# Patient Record
Sex: MALE | Race: OTHER | HISPANIC OR LATINO | URBAN - METROPOLITAN AREA
[De-identification: names, ages, dates, MRNs, and addresses within clinical notes are randomized per-mention and may not be internally consistent; named-entity substitution may affect disease eponyms.]

---

## 2023-03-09 ENCOUNTER — EMERGENCY (EMERGENCY)
Facility: HOSPITAL | Age: 43
LOS: 1 days | Discharge: HOME | End: 2023-03-09
Attending: EMERGENCY MEDICINE
Payer: COMMERCIAL

## 2023-03-09 VITALS
OXYGEN SATURATION: 96 % | TEMPERATURE: 98 F | SYSTOLIC BLOOD PRESSURE: 119 MMHG | RESPIRATION RATE: 18 BRPM | DIASTOLIC BLOOD PRESSURE: 81 MMHG | HEART RATE: 88 BPM | WEIGHT: 149.91 LBS | HEIGHT: 66 IN

## 2023-03-09 LAB
ALBUMIN SERPL ELPH-MCNC: 4.3 G/DL — SIGNIFICANT CHANGE UP (ref 3.3–5.2)
ALP SERPL-CCNC: 84 U/L — SIGNIFICANT CHANGE UP (ref 40–120)
ALT FLD-CCNC: 21 U/L — SIGNIFICANT CHANGE UP
ANION GAP SERPL CALC-SCNC: 12 MMOL/L — SIGNIFICANT CHANGE UP (ref 5–17)
APTT BLD: 31.2 SEC — SIGNIFICANT CHANGE UP (ref 27.5–35.5)
AST SERPL-CCNC: 31 U/L — SIGNIFICANT CHANGE UP
BASOPHILS # BLD AUTO: 0.02 K/UL — SIGNIFICANT CHANGE UP (ref 0–0.2)
BASOPHILS NFR BLD AUTO: 0.3 % — SIGNIFICANT CHANGE UP (ref 0–2)
BILIRUB SERPL-MCNC: 0.3 MG/DL — LOW (ref 0.4–2)
BLD GP AB SCN SERPL QL: SIGNIFICANT CHANGE UP
BUN SERPL-MCNC: 18.3 MG/DL — SIGNIFICANT CHANGE UP (ref 8–20)
CALCIUM SERPL-MCNC: 8.7 MG/DL — SIGNIFICANT CHANGE UP (ref 8.4–10.5)
CHLORIDE SERPL-SCNC: 104 MMOL/L — SIGNIFICANT CHANGE UP (ref 96–108)
CO2 SERPL-SCNC: 25 MMOL/L — SIGNIFICANT CHANGE UP (ref 22–29)
CREAT SERPL-MCNC: 0.85 MG/DL — SIGNIFICANT CHANGE UP (ref 0.5–1.3)
EGFR: 111 ML/MIN/1.73M2 — SIGNIFICANT CHANGE UP
EOSINOPHIL # BLD AUTO: 0.03 K/UL — SIGNIFICANT CHANGE UP (ref 0–0.5)
EOSINOPHIL NFR BLD AUTO: 0.4 % — SIGNIFICANT CHANGE UP (ref 0–6)
GLUCOSE SERPL-MCNC: 114 MG/DL — HIGH (ref 70–99)
HCT VFR BLD CALC: 45 % — SIGNIFICANT CHANGE UP (ref 39–50)
HGB BLD-MCNC: 15.1 G/DL — SIGNIFICANT CHANGE UP (ref 13–17)
IMM GRANULOCYTES NFR BLD AUTO: 0.3 % — SIGNIFICANT CHANGE UP (ref 0–0.9)
INR BLD: 1.08 RATIO — SIGNIFICANT CHANGE UP (ref 0.88–1.16)
LYMPHOCYTES # BLD AUTO: 0.94 K/UL — LOW (ref 1–3.3)
LYMPHOCYTES # BLD AUTO: 13.9 % — SIGNIFICANT CHANGE UP (ref 13–44)
MCHC RBC-ENTMCNC: 30 PG — SIGNIFICANT CHANGE UP (ref 27–34)
MCHC RBC-ENTMCNC: 33.6 GM/DL — SIGNIFICANT CHANGE UP (ref 32–36)
MCV RBC AUTO: 89.3 FL — SIGNIFICANT CHANGE UP (ref 80–100)
MONOCYTES # BLD AUTO: 0.3 K/UL — SIGNIFICANT CHANGE UP (ref 0–0.9)
MONOCYTES NFR BLD AUTO: 4.4 % — SIGNIFICANT CHANGE UP (ref 2–14)
NEUTROPHILS # BLD AUTO: 5.46 K/UL — SIGNIFICANT CHANGE UP (ref 1.8–7.4)
NEUTROPHILS NFR BLD AUTO: 80.7 % — HIGH (ref 43–77)
PLATELET # BLD AUTO: 293 K/UL — SIGNIFICANT CHANGE UP (ref 150–400)
POTASSIUM SERPL-MCNC: 4.3 MMOL/L — SIGNIFICANT CHANGE UP (ref 3.5–5.3)
POTASSIUM SERPL-SCNC: 4.3 MMOL/L — SIGNIFICANT CHANGE UP (ref 3.5–5.3)
PROT SERPL-MCNC: 7.2 G/DL — SIGNIFICANT CHANGE UP (ref 6.6–8.7)
PROTHROM AB SERPL-ACNC: 12.5 SEC — SIGNIFICANT CHANGE UP (ref 10.5–13.4)
RBC # BLD: 5.04 M/UL — SIGNIFICANT CHANGE UP (ref 4.2–5.8)
RBC # FLD: 12.8 % — SIGNIFICANT CHANGE UP (ref 10.3–14.5)
SARS-COV-2 RNA SPEC QL NAA+PROBE: SIGNIFICANT CHANGE UP
SODIUM SERPL-SCNC: 141 MMOL/L — SIGNIFICANT CHANGE UP (ref 135–145)
WBC # BLD: 6.77 K/UL — SIGNIFICANT CHANGE UP (ref 3.8–10.5)
WBC # FLD AUTO: 6.77 K/UL — SIGNIFICANT CHANGE UP (ref 3.8–10.5)

## 2023-03-09 PROCEDURE — 96374 THER/PROPH/DIAG INJ IV PUSH: CPT

## 2023-03-09 PROCEDURE — U0003: CPT

## 2023-03-09 PROCEDURE — 99285 EMERGENCY DEPT VISIT HI MDM: CPT

## 2023-03-09 PROCEDURE — 86900 BLOOD TYPING SEROLOGIC ABO: CPT

## 2023-03-09 PROCEDURE — 86850 RBC ANTIBODY SCREEN: CPT

## 2023-03-09 PROCEDURE — 86901 BLOOD TYPING SEROLOGIC RH(D): CPT

## 2023-03-09 PROCEDURE — 85025 COMPLETE CBC W/AUTO DIFF WBC: CPT

## 2023-03-09 PROCEDURE — U0005: CPT

## 2023-03-09 PROCEDURE — 80053 COMPREHEN METABOLIC PANEL: CPT

## 2023-03-09 PROCEDURE — 99284 EMERGENCY DEPT VISIT MOD MDM: CPT | Mod: 25

## 2023-03-09 PROCEDURE — 85610 PROTHROMBIN TIME: CPT

## 2023-03-09 PROCEDURE — 85730 THROMBOPLASTIN TIME PARTIAL: CPT

## 2023-03-09 PROCEDURE — 36415 COLL VENOUS BLD VENIPUNCTURE: CPT

## 2023-03-09 PROCEDURE — T1013: CPT

## 2023-03-09 RX ORDER — SENNA PLUS 8.6 MG/1
1 TABLET ORAL ONCE
Refills: 0 | Status: COMPLETED | OUTPATIENT
Start: 2023-03-09 | End: 2023-03-09

## 2023-03-09 RX ORDER — MORPHINE SULFATE 50 MG/1
4 CAPSULE, EXTENDED RELEASE ORAL ONCE
Refills: 0 | Status: DISCONTINUED | OUTPATIENT
Start: 2023-03-09 | End: 2023-03-09

## 2023-03-09 RX ADMIN — SENNA PLUS 1 TABLET(S): 8.6 TABLET ORAL at 14:15

## 2023-03-09 RX ADMIN — MORPHINE SULFATE 4 MILLIGRAM(S): 50 CAPSULE, EXTENDED RELEASE ORAL at 10:38

## 2023-03-09 NOTE — ED PROVIDER NOTE - CARE PROVIDER_API CALL
Roc Mcdaniel)  Surgery  [address] [address]  Phone: [phone]  Fax: [phone]  Follow Up Time:
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
## 2023-03-09 NOTE — ED PROVIDER NOTE - PROGRESS NOTE DETAILS
AJM:   Prolapse has been reduced by colorectal surgery.  Patient observed for several hours after reduction. Patient feeling significantly improved.  Tolerating p.o.  Stable for discharge home with outpatient colorectal follow-up and stool softeners.  Patient comfortable with plan and return precautions have been discussed. AJM:   Prolapse has been reduced by colorectal surgery.  Patient observed for several hours after reduction. Patient feeling significantly improved.  Tolerating p.o.  Stable for discharge home with outpatient colorectal follow-up and stool softeners.  Patient comfortable with plan and return precautions have been discussed. In-person  used for entire encounter

## 2023-03-09 NOTE — ED PROVIDER NOTE - CLINICAL SUMMARY MEDICAL DECISION MAKING FREE TEXT BOX
patient presenting with significant rectal prolapse.  Will obtain preop labs, pain control, colorectal consultation would not attempt self reduction in ED given extent of prolapse
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
## 2023-03-09 NOTE — ED PROVIDER NOTE - NSFOLLOWUPINSTRUCTIONS_ED_ALL_ED_FT
Seguimiento con la clínica de cirugía colorrectal la próxima semana. Asegúrese de r[place] ablandadores de heces y Metamucil diariamente para ayudar a evitar que esto vuelva a suceder. Regrese al departamento de emergencias inmediatamente si los síntomas reaparecen.    El prolapso rectal ocurre cuando el interior de la última sección del intestino grueso (recto)  en blanka posición anormal. Hay dos tipos de prolapso rectal:    Parcial. En el prolapso rectal parcial, la membrana que recubre el interior del recto  o sobresale fuera de souza lugar y puede salir fuera del ano.  Completa. En el prolapso rectal completo, todas las capas del recto caen o sobresalen fuera de souza lugar y pueden salir fuera del ano.    Al principio, el prolapso rectal puede ser temporal. Puede ocurrir solo al tener deposiciones. Con el tiempo, el prolapso probablemente empeorará. Puede comenzar a aparecer con más frecuencia y causar síntomas incómodos. Finalmente, el prolapso puede ocurrir al caminar o estar de pie. Habitualmente, se necesita cirugía para esta afección.    ¿Cuáles son las causas?  La causa de esta afección puede ser la debilidad de los músculos que unen el recto al interior de la alicja baja del abdomen. Con frecuencia, se desconoce la causa exacta de esta debilidad muscular.    ¿Qué incrementa el riesgo?  Es más probable que tenga esta afección si:    Tiene antecedentes de estreñimiento crónico o diarrea.  Con frecuencia necesita esforzarse para tener deposiciones.  Es ruth de 50 años o más.  Tiene un trastorno neurológico, brittany esclerosis múltiple, o blanka lesión en la parte inferior de la médula garcia.  Es ruth y estuvo embarazada muchas veces.  Se sometió a blanka cirugía pélvica o del recto.  Tiene fibrosis quística.    ¿Cuáles son los signos o los síntomas?  El síntoma principal de esta afección es blanka masa de tejido blessing que sobresale del ano. La masa puede parecer inflamada, tener mucosidad o sangrar levemente.    Al principio, la masa puede aparecer solamente después de las deposiciones.  Luego, la masa puede comenzar a aparecer con más frecuencia.    Otros síntomas pueden incluir:    Molestias en el ano y el recto.  Estreñimiento.  Sensación de que las heces no se vacían por completo del recto.  Diarrea.  Pérdida de heces, mucosidad o siomara por el ano (incontinencia fecal).  Sensación de que se está sentado sobre blanka pelota.    ¿Cómo se diagnostica?  Esta afección se puede diagnosticar en función de los síntomas y de un examen físico.    Dalia el examen, posiblemente se le pida que se ponga de cuclillas y ricardo un esfuerzo brittany si fuera a defecar.  También pueden hacerle estudios, por ejemplo:    Un examen rectal utilizando un endoscopio flexible (sigmoidoscopia o colonoscopía).  Un procedimiento en el que se missy radiografías del recto después de que se le inyecta un tinte (medio de contraste) en el recto (defecografía).    ¿Cómo se trata?  Generalmente, esta afección se trata con cirugía para reparar los músculos debilitados y para reconectar el recto a las uniones en el interior de la parte baja del abdomen. Otras opciones de tratamiento son las siguientes:    Empujar la alicja del prolapso de nuevo hacia el interior del recto (reducción).    El médico puede hacerlo empujando suavemente con un paño húmedo.  El médico puede mostrarle cómo hacerlo en souza casa si el prolapso ocurre nuevamente.  Medicamentos para prevenir el estreñimiento y la necesidad de hacer fuerza. Estos pueden incluir laxantes o ablandadores de heces.    Siga estas indicaciones en souza casa:      Indicaciones generales     Iliff los medicamentos de venta leona y los recetados solamente brittany se lo haya indicado el médico.  No ricardo fuerza para defecar.  No levante ningún objeto que pese más de 10 libras (4,5 kg) o el límite de peso que le hayan indicado, hasta que el médico le diga que puede hacerlo.  Siga las indicaciones del médico acerca de qué debe hacer si el prolapso ocurre nuevamente y no vuelve hacia dentro. Puede ser necesario que se acueste sobre el costado y use un paño húmedo para empujar suavemente el bulto hacia el interior del recto.  Concurra a todas las visitas de seguimiento brittany se lo haya indicado el médico. [place] es importante.        Prevención del estreñimiento     A fin de prevenir o tratar el estreñimiento, el médico puede recomendarle que:    Sarah suficiente líquido brittany para mantener la orina de color amarillo pálido.  Iliff medicamentos recetados o de venta leona.  Consuma alimentos ricos en fibra, brittany frijoles, cereales integrales, y frutas y verduras frescas.  Limite el consumo de alimentos ricos en grasa y azúcares procesados, brittany los alimentos fritos o dulces.    Comuníquese con un médico si:  Tiene fiebre.  El prolapso no puede reducirse en souza casa.  Sufre estreñimiento o diarrea.  Tiene leve sangrado rectal.    Solicite ayuda inmediatamente si:  Siente mucho dolor rectal.  Tiene sangrado rectal abundante.    Resumen  El prolapso rectal ocurre cuando el interior del recto  en blanka posición anormal. En algunos casos, el recto puede empujar de manera parcial o completa a través del orificio anal.  Generalmente, esta afección se trata con cirugía para reparar los músculos debilitados y para reconectar el recto a las uniones en el interior de la parte baja del abdomen.  Iliff los medicamentos de venta leona y los recetados solamente brittany se lo haya indicado el médico.  Siga las indicaciones del médico acerca de qué debe hacer si el prolapso ocurre nuevamente y no vuelve hacia dentro.  Solicite ayuda de inmediato si tiene mucho dolor rectal o si tiene un sangrado abundante del recto.

## 2023-03-09 NOTE — ED PROVIDER NOTE - PATIENT PORTAL LINK FT
You can access the FollowMyHealth Patient Portal offered by [facility] by registering at the following website: [email or web address]. By joining [employer]’s FollowMyHealth portal, you will also be able to view your health information using other applications (apps) compatible with our system.
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
## 2023-03-09 NOTE — CONSULT NOTE ADULT - ASSESSMENT
ASSESSMENT: Patient is a 43ym presenting with rectal prolapse now s/p bedside reduction    PLAN:    - Prolapse successfully reduced at bedside  - Please monitor patient for 2-3 hours more and PO trial  - If patient tolerates PO can be dc'ed from ED  - Recommend high fiber diet, metamucil, and stool softener  - Follow up with [facility] as outpatient  - Please give patient strict return precautions  - If prolapse should recur and he is unable to reduce it, he should return immediately  - Patient seen/examined with senior surgery resident  - Plan discussed with Attending, Dr. Mcdaniel

## 2023-03-09 NOTE — ED ADULT NURSE REASSESSMENT NOTE - NS ED NURSE REASSESS COMMENT FT1
Pt a&ox4, respirations even and unlabored, administered PO liquids to observe to see how patient feels after, no distress noted.

## 2023-03-09 NOTE — ED PROVIDER NOTE - GASTROINTESTINAL, MLM
Abdomen soft, non-tender, no guarding. Severe rectal prolapse noted with significant edema approximately 8 -10  inches of edematous bowel noted outside of anus.  Tender to touch. Chaperone JAZMIN perez

## 2023-03-13 PROBLEM — Z78.9 OTHER SPECIFIED HEALTH STATUS: Chronic | Status: ACTIVE | Noted: 2023-03-09

## 2023-03-24 PROBLEM — Z00.00 ENCOUNTER FOR PREVENTIVE HEALTH EXAMINATION: Status: ACTIVE | Noted: 2023-03-24

## 2023-04-11 ENCOUNTER — APPOINTMENT (OUTPATIENT)
Dept: COLORECTAL SURGERY | Facility: CLINIC | Age: 43
End: 2023-04-11

## 2023-07-22 NOTE — ED PROVIDER NOTE - PSYCHIATRIC, MLM
Devlin reversal
Alert and oriented to person, place, time/situation. normal mood and affect. no apparent risk to self or others.
Devlin Reversal
Devlin reversal
